# Patient Record
Sex: MALE | URBAN - METROPOLITAN AREA
[De-identification: names, ages, dates, MRNs, and addresses within clinical notes are randomized per-mention and may not be internally consistent; named-entity substitution may affect disease eponyms.]

---

## 2021-08-05 ENCOUNTER — NURSE TRIAGE (OUTPATIENT)
Dept: NURSING | Facility: CLINIC | Age: 31
End: 2021-08-05

## 2021-08-05 NOTE — TELEPHONE ENCOUNTER
Patient calling, and after initially wanting to stay anonymous, gave his name and birthdate.  He says he broke an insulin needle off in his arm two days ago, and it is still there and cannot get it out.    Patient advised to go to ER per guideline.    Patience Olivares RN  [facility]       Reason for Disposition    Sensation of something still in the wound (i.e., needle broke off)    Protocols used: [id number]